# Patient Record
Sex: MALE | Race: OTHER | HISPANIC OR LATINO | URBAN - METROPOLITAN AREA
[De-identification: names, ages, dates, MRNs, and addresses within clinical notes are randomized per-mention and may not be internally consistent; named-entity substitution may affect disease eponyms.]

---

## 2022-04-10 ENCOUNTER — EMERGENCY (EMERGENCY)
Facility: HOSPITAL | Age: 22
LOS: 0 days | Discharge: HOME | End: 2022-04-10
Attending: EMERGENCY MEDICINE
Payer: MEDICAID

## 2022-04-10 VITALS
RESPIRATION RATE: 18 BRPM | DIASTOLIC BLOOD PRESSURE: 69 MMHG | HEART RATE: 61 BPM | SYSTOLIC BLOOD PRESSURE: 111 MMHG | TEMPERATURE: 98 F | OXYGEN SATURATION: 98 %

## 2022-04-10 VITALS
DIASTOLIC BLOOD PRESSURE: 54 MMHG | HEART RATE: 54 BPM | RESPIRATION RATE: 16 BRPM | SYSTOLIC BLOOD PRESSURE: 98 MMHG | OXYGEN SATURATION: 100 % | TEMPERATURE: 98 F
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
## 2022-04-10 DIAGNOSIS — S51.052A OPEN BITE, LEFT ELBOW, INITIAL ENCOUNTER: ICD-10-CM
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
## 2022-04-10 DIAGNOSIS — Z23 ENCOUNTER FOR IMMUNIZATION: ICD-10-CM

## 2022-04-10 DIAGNOSIS — S51.032A PUNCTURE WOUND WITHOUT FOREIGN BODY OF LEFT ELBOW, INITIAL ENCOUNTER: ICD-10-CM

## 2022-04-10 PROCEDURE — 90715 TDAP VACCINE 7 YRS/> IM: CPT

## 2022-04-10 PROCEDURE — 90471 IMMUNIZATION ADMIN: CPT

## 2022-04-10 PROCEDURE — 99283 EMERGENCY DEPT VISIT LOW MDM: CPT | Mod: 25

## 2022-04-10 PROCEDURE — 99284 EMERGENCY DEPT VISIT MOD MDM: CPT

## 2022-04-10 RX ORDER — TETANUS TOXOID, REDUCED DIPHTHERIA TOXOID AND ACELLULAR PERTUSSIS VACCINE, ADSORBED 5; 2.5; 8; 8; 2.5 [IU]/.5ML; [IU]/.5ML; UG/.5ML; UG/.5ML; UG/.5ML
0.5 SUSPENSION INTRAMUSCULAR ONCE
Refills: 0 | Status: COMPLETED | OUTPATIENT
Start: 2022-04-10 | End: 2022-04-10

## 2022-04-10 RX ADMIN — Medication 1 TABLET(S): at 21:55

## 2022-04-10 RX ADMIN — TETANUS TOXOID, REDUCED DIPHTHERIA TOXOID AND ACELLULAR PERTUSSIS VACCINE, ADSORBED 0.5 MILLILITER(S): 5; 2.5; 8; 8; 2.5 SUSPENSION INTRAMUSCULAR at 21:55

## 2022-04-10 NOTE — ED STATDOCS - NSFOLLOWUPCLINICS_GEN_ALL_ED_FT
[facility]  Family Medicine  [address] [place] [address]  Phone: [phone]  Fax:

## 2022-04-10 NOTE — ED STATDOCS - CLINICAL SUMMARY MEDICAL DECISION MAKING FREE TEXT BOX
20 y/o male with domesticated dog bite. Will fill out dog bite form to send to [facility], Tdap & Augmentin to be given in the ED, and wound check by PMD within a week.

## 2022-04-10 NOTE — ED STATDOCS - CHIEF COMPLAINT
[facility]  [address], [address]  [place] [address]  [phone]      [birth year] 2017      Arnold Horne  [address] [address]        To whom it may concern,    Arnold Horne is under my care for migraines and for vertigo.  He may occasionally need to miss school when symptoms of these conditions are incapacitating.       Sincerely,          Shanelle So MD      The patient is a 21y year old Male complaining of bite, animal.

## 2022-04-10 NOTE — ED STATDOCS - SKIN, MLM
circular dog bite on the left antecubital fossa with multiple punctures, no active bleeding, no fluctuance or hematoma.

## 2022-04-10 NOTE — ED STATDOCS - PATIENT PORTAL LINK FT
You can access the FollowMyHealth Patient Portal offered by [facility] by registering at the following website: [email or web address]. By joining [employer]’s FollowMyHealth portal, you will also be able to view your health information using other applications (apps) compatible with our system.

## 2022-04-10 NOTE — ED ADULT TRIAGE NOTE - CHIEF COMPLAINT QUOTE
pt states he got bit by a dog earlier today. pt has small bite efren on left inner elbow. no bleeding noted. no signs of distress noted. pt ambulatory in triage.

## 2022-04-10 NOTE — ED STATDOCS - NSFOLLOWUPINSTRUCTIONS_ED_ALL_ED_FT
Take ibuprofen or tylenol for pain.   antibiotic at pharmacy and take as prescribed.   Wound check by your doctor within 1 week.                                                                                                                                                                                                                                                                                                                                                                                                                                                                                                                                                                                                                                                                                                                                                                                                                                                                              Animal Bite, Adult      Animal bite wounds can be mild or serious. It is important to get medical treatment to prevent infection. Ask your doctor if you need treatment to prevent an infection that can spread from animals to humans (rabies).      Follow these instructions at home:      Wound care    •Follow instructions from your doctor about how to take care of your wound. Make sure you:  •Wash your hands with soap and water before you change your bandage (dressing). If you cannot use soap and water, use hand .      •Change your bandage as told by your doctor.      •Leave stitches (sutures), skin glue, or skin tape (adhesive) strips in place. They may need to stay in place for 2 weeks or longer. If tape strips get loose and curl up, you may trim the loose edges. Do not remove tape strips completely unless your doctor says it is okay.      •Check your wound every day for signs of infection. Check for:  •More redness, swelling, or pain.      •More fluid or blood.      •Warmth.      •Pus or a bad smell.        Medicines     •Take or apply over-the-counter and prescription medicines only as told by your doctor.      •If you were prescribed an antibiotic, take or apply it as told by your doctor. Do not stop using the antibiotic even if your wound gets better.        General instructions      •Keep the injured area raised (elevated) above the level of your heart while you are sitting or lying down.    •If directed, put ice on the injured area.  •Put ice in a plastic bag.      •Place a towel between your skin and the bag.      •Leave the ice on for 20 minutes, 2–3 times per day.        •Keep all follow-up visits as told by your doctor. This is important.        Contact a doctor if:    •You have more redness, swelling, or pain around your wound.      •Your wound feels warm to the touch.      •You have a fever or chills.      •You have a general feeling of sickness (malaise).      •You feel sick to your stomach (nauseous).      •You throw up (vomit).      •You have pain that does not get better.        Get help right away if:    •You have a red streak going away from your wound.    •You have any of these coming from your wound:  •Non-clear fluid.      •More blood.      •Pus or a bad smell.        •You have trouble moving your injured area.      •You lose feeling (have numbness) or feel tingling anywhere on your body.        Summary    •It is important to get the right medical treatment for animal bites. Treatment can help you to not get an infection. Ask your doctor if you need treatment to prevent an infection that can spread from animals to humans (rabies).      •Check your wound every day for signs of infection, such as more redness or swelling instead of less.      •If you have a red streak going away from your wound, get medical help right away.      This information is not intended to replace advice given to you by your health care provider. Make sure you discuss any questions you have with your health care provider.      Document Revised: 10/12/2021 Document Reviewed: 10/12/2021    Elsevier Patient Education © 2022 Elsevier Inc.

## 2022-04-10 NOTE — ED STATDOCS - OBJECTIVE STATEMENT
20 y/o male presents to the ED c/o dog bite to left inner elbow approx. 2 hours ago. Pt reports he was bit by a [place] lizama. Dog's vaccinations are reportedly UTD. Denies other injury or symptoms. Pt's tetanus not UTD, No other complaints at this time.

## 2022-04-10 NOTE — ED STATDOCS - MUSCULOSKELETAL, MLM
range of motion is not limited and there is no muscle tenderness. Left arm: soft compartment, FROM, and hetal pulses.

## 2022-05-07 ENCOUNTER — EMERGENCY (EMERGENCY)
Facility: HOSPITAL | Age: 22
LOS: 0 days | Discharge: HOME | End: 2022-05-07
Attending: EMERGENCY MEDICINE
Payer: MEDICAID

## 2022-05-07 VITALS
TEMPERATURE: 99 F | HEART RATE: 77 BPM | SYSTOLIC BLOOD PRESSURE: 131 MMHG | RESPIRATION RATE: 18 BRPM | DIASTOLIC BLOOD PRESSURE: 67 MMHG | OXYGEN SATURATION: 100 %

## 2022-05-07 VITALS
TEMPERATURE: 98 F | HEART RATE: 70 BPM | OXYGEN SATURATION: 100 % | SYSTOLIC BLOOD PRESSURE: 126 MMHG | DIASTOLIC BLOOD PRESSURE: 62 MMHG | RESPIRATION RATE: 18 BRPM

## 2022-05-07 DIAGNOSIS — R07.89 OTHER CHEST PAIN: ICD-10-CM

## 2022-05-07 DIAGNOSIS — R06.02 SHORTNESS OF BREATH: ICD-10-CM

## 2022-05-07 DIAGNOSIS — R00.2 PALPITATIONS: ICD-10-CM

## 2022-05-07 PROBLEM — Z78.9 OTHER SPECIFIED HEALTH STATUS: Chronic | Status: ACTIVE | Noted: 2022-04-10

## 2022-05-07 LAB
ALBUMIN SERPL ELPH-MCNC: 4.1 G/DL — SIGNIFICANT CHANGE UP (ref 3.3–5)
ALP SERPL-CCNC: 84 U/L — SIGNIFICANT CHANGE UP (ref 40–120)
ALT FLD-CCNC: 32 U/L — SIGNIFICANT CHANGE UP (ref 12–78)
ANION GAP SERPL CALC-SCNC: 8 MMOL/L — SIGNIFICANT CHANGE UP (ref 5–17)
APTT BLD: 32.9 SEC — SIGNIFICANT CHANGE UP (ref 27.5–35.5)
AST SERPL-CCNC: 18 U/L — SIGNIFICANT CHANGE UP (ref 15–37)
BASOPHILS # BLD AUTO: 0.04 K/UL — SIGNIFICANT CHANGE UP (ref 0–0.2)
BASOPHILS NFR BLD AUTO: 0.4 % — SIGNIFICANT CHANGE UP (ref 0–2)
BILIRUB SERPL-MCNC: 0.3 MG/DL — SIGNIFICANT CHANGE UP (ref 0.2–1.2)
BUN SERPL-MCNC: 18 MG/DL — SIGNIFICANT CHANGE UP (ref 7–23)
CALCIUM SERPL-MCNC: 9 MG/DL — SIGNIFICANT CHANGE UP (ref 8.5–10.1)
CHLORIDE SERPL-SCNC: 107 MMOL/L — SIGNIFICANT CHANGE UP (ref 96–108)
CO2 SERPL-SCNC: 24 MMOL/L — SIGNIFICANT CHANGE UP (ref 22–31)
CREAT SERPL-MCNC: 0.96 MG/DL — SIGNIFICANT CHANGE UP (ref 0.5–1.3)
D DIMER BLD IA.RAPID-MCNC: <150 NG/ML DDU — SIGNIFICANT CHANGE UP
EGFR: 116 ML/MIN/1.73M2 — SIGNIFICANT CHANGE UP
EOSINOPHIL # BLD AUTO: 0.16 K/UL — SIGNIFICANT CHANGE UP (ref 0–0.5)
EOSINOPHIL NFR BLD AUTO: 1.7 % — SIGNIFICANT CHANGE UP (ref 0–6)
GLUCOSE SERPL-MCNC: 110 MG/DL — HIGH (ref 70–99)
HCT VFR BLD CALC: 38.4 % — LOW (ref 39–50)
HGB BLD-MCNC: 13.4 G/DL — SIGNIFICANT CHANGE UP (ref 13–17)
IMM GRANULOCYTES NFR BLD AUTO: 0.2 % — SIGNIFICANT CHANGE UP (ref 0–1.5)
INR BLD: 1.2 RATIO — HIGH (ref 0.88–1.16)
LYMPHOCYTES # BLD AUTO: 2.22 K/UL — SIGNIFICANT CHANGE UP (ref 1–3.3)
LYMPHOCYTES # BLD AUTO: 24 % — SIGNIFICANT CHANGE UP (ref 13–44)
MAGNESIUM SERPL-MCNC: 2.3 MG/DL — SIGNIFICANT CHANGE UP (ref 1.6–2.6)
MCHC RBC-ENTMCNC: 30 PG — SIGNIFICANT CHANGE UP (ref 27–34)
MCHC RBC-ENTMCNC: 34.9 GM/DL — SIGNIFICANT CHANGE UP (ref 32–36)
MCV RBC AUTO: 85.9 FL — SIGNIFICANT CHANGE UP (ref 80–100)
MONOCYTES # BLD AUTO: 0.76 K/UL — SIGNIFICANT CHANGE UP (ref 0–0.9)
MONOCYTES NFR BLD AUTO: 8.2 % — SIGNIFICANT CHANGE UP (ref 2–14)
NEUTROPHILS # BLD AUTO: 6.05 K/UL — SIGNIFICANT CHANGE UP (ref 1.8–7.4)
NEUTROPHILS NFR BLD AUTO: 65.5 % — SIGNIFICANT CHANGE UP (ref 43–77)
NT-PROBNP SERPL-SCNC: 20 PG/ML — SIGNIFICANT CHANGE UP (ref 0–125)
PCP SPEC-MCNC: SIGNIFICANT CHANGE UP
PLATELET # BLD AUTO: 259 K/UL — SIGNIFICANT CHANGE UP (ref 150–400)
POTASSIUM SERPL-MCNC: 4.1 MMOL/L — SIGNIFICANT CHANGE UP (ref 3.5–5.3)
POTASSIUM SERPL-SCNC: 4.1 MMOL/L — SIGNIFICANT CHANGE UP (ref 3.5–5.3)
PROT SERPL-MCNC: 7.5 GM/DL — SIGNIFICANT CHANGE UP (ref 6–8.3)
PROTHROM AB SERPL-ACNC: 13.9 SEC — HIGH (ref 10.5–13.4)
RBC # BLD: 4.47 M/UL — SIGNIFICANT CHANGE UP (ref 4.2–5.8)
RBC # FLD: 12.1 % — SIGNIFICANT CHANGE UP (ref 10.3–14.5)
SODIUM SERPL-SCNC: 139 MMOL/L — SIGNIFICANT CHANGE UP (ref 135–145)
TROPONIN I, HIGH SENSITIVITY RESULT: 16.38 NG/L — SIGNIFICANT CHANGE UP
WBC # BLD: 9.25 K/UL — SIGNIFICANT CHANGE UP (ref 3.8–10.5)
WBC # FLD AUTO: 9.25 K/UL — SIGNIFICANT CHANGE UP (ref 3.8–10.5)

## 2022-05-07 PROCEDURE — 85379 FIBRIN DEGRADATION QUANT: CPT

## 2022-05-07 PROCEDURE — 80053 COMPREHEN METABOLIC PANEL: CPT

## 2022-05-07 PROCEDURE — 71045 X-RAY EXAM CHEST 1 VIEW: CPT | Mod: 26

## 2022-05-07 PROCEDURE — 80307 DRUG TEST PRSMV CHEM ANLYZR: CPT

## 2022-05-07 PROCEDURE — 93005 ELECTROCARDIOGRAM TRACING: CPT

## 2022-05-07 PROCEDURE — 36415 COLL VENOUS BLD VENIPUNCTURE: CPT

## 2022-05-07 PROCEDURE — 83735 ASSAY OF MAGNESIUM: CPT

## 2022-05-07 PROCEDURE — 84484 ASSAY OF TROPONIN QUANT: CPT

## 2022-05-07 PROCEDURE — 85730 THROMBOPLASTIN TIME PARTIAL: CPT

## 2022-05-07 PROCEDURE — 85610 PROTHROMBIN TIME: CPT

## 2022-05-07 PROCEDURE — 99285 EMERGENCY DEPT VISIT HI MDM: CPT | Mod: 25

## 2022-05-07 PROCEDURE — 93010 ELECTROCARDIOGRAM REPORT: CPT

## 2022-05-07 PROCEDURE — 71045 X-RAY EXAM CHEST 1 VIEW: CPT

## 2022-05-07 PROCEDURE — 99285 EMERGENCY DEPT VISIT HI MDM: CPT

## 2022-05-07 PROCEDURE — 83880 ASSAY OF NATRIURETIC PEPTIDE: CPT

## 2022-05-07 PROCEDURE — 85025 COMPLETE CBC W/AUTO DIFF WBC: CPT

## 2022-05-07 NOTE — ED STATDOCS - CLINICAL SUMMARY MEDICAL DECISION MAKING FREE TEXT BOX
22 yo well appearing with episodes of palpitations, chest tightness and SOB. Nl ekg. suspect anxiety however never had this before. Denies drugs.  Will ck labs, cxr. ReEval.

## 2022-05-07 NOTE — ED STATDOCS - NS ED ATTENDING STATEMENT MOD
This was a shared visit with the JASS. I reviewed and verified the documentation and independently performed the documented:

## 2022-05-07 NOTE — ED ADULT NURSE NOTE - NSIMPLEMENTINTERV_GEN_ALL_ED
Implemented All Universal Safety Interventions:  [place] to call system. Call bell, personal items and telephone within reach. Instruct patient to call for assistance. Room bathroom lighting operational. Non-slip footwear when patient is off stretcher. Physically safe environment: no spills, clutter or unnecessary equipment. Stretcher in lowest position, wheels locked, appropriate side rails in place.

## 2022-05-07 NOTE — ED STATDOCS - PHYSICAL EXAMINATION
Constitutional: NAD AOx3  Eyes: PERRL EOMI  Head: Normocephalic atraumatic  Mouth: MMM  Cardiac: regular rate and rhythm  Resp: Lungs CTAB  GI: Abd s/nd/nt  Neuro: CN2-12 grossly intact, JOHN x 4  Skin: No visible rashes

## 2022-05-07 NOTE — ED ADULT TRIAGE NOTE - CHIEF COMPLAINT QUOTE
PT C/O "[employer] HR, AND TIGHTNESS IN CHEST AND DIZZINESS" FOR 4 DAYS.  EKG REQUESTED UPON ARRIVAL TO ED.  DENIES ANY RECREATIONAL DRUG USE OR ETOH USE

## 2022-05-07 NOTE — ED STATDOCS - OBJECTIVE STATEMENT
22 yo M no PMHX from home, with c/o episodes in last 4 days of palpitations, chest tightness and almost passing out. Lasts few seconds. No known trigger. Not food related or exercise related. Denies new vitamins, caffeine, supplements. Denies vaping or drugs. Denies any recent stressors. Has had 3/3 covid vaccines. Has not had Covid. No early CAD in the family. No surgeries. Feels well now.

## 2022-05-07 NOTE — ED STATDOCS - PATIENT PORTAL LINK FT
You can access the FollowMyHealth Patient Portal offered by [facility] by registering at the following website: [email or web address]. By joining [employer]’s FollowMyHealth portal, you will also be able to view your health information using other applications (apps) compatible with our system.

## 2022-05-07 NOTE — ED STATDOCS - CARE PROVIDER_API CALL
Torres De Jesus (MD)  Cardiovascular Disease  [address], [address]  [place] [address]  Phone: [phone]  Fax: [phone]  Follow Up Time: Urgent

## 2022-05-07 NOTE — ED STATDOCS - PROGRESS NOTE DETAILS
pt aware of labs and imaging results and will fu with cardiologist, pt presented with palpitations, pt knows to return to ED for any worsening of symptoms and agrees with plan. -Joselyn Kendall PA-C

## 2022-05-07 NOTE — ED STATDOCS - NSFOLLOWUPCLINICS_GEN_ALL_ED_FT
[facility]  Family Medicine  [address] [place] [address]  Phone: [phone]  Fax:   Follow Up Time: Urgent

## 2022-05-19 ENCOUNTER — APPOINTMENT (OUTPATIENT)
Dept: CARDIOLOGY | Facility: CLINIC | Age: 22
End: 2022-05-19
Payer: MEDICAID

## 2022-05-19 VITALS
DIASTOLIC BLOOD PRESSURE: 60 MMHG | HEART RATE: 59 BPM | SYSTOLIC BLOOD PRESSURE: 110 MMHG | OXYGEN SATURATION: 99 % | BODY MASS INDEX: 23.55 KG/M2 | HEIGHT: 70 IN | WEIGHT: 164.46 LBS

## 2022-05-19 DIAGNOSIS — R55 SYNCOPE AND COLLAPSE: ICD-10-CM

## 2022-05-19 PROCEDURE — 93000 ELECTROCARDIOGRAM COMPLETE: CPT

## 2022-05-19 PROCEDURE — 99204 OFFICE O/P NEW MOD 45 MIN: CPT

## 2022-05-19 NOTE — HISTORY OF PRESENT ILLNESS
[FreeTextEntry1] : 22 yo male presents for evaluation of episodes of palpitations and near syncope. These episodes have occurred at rest. 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 Pt denies family history of CAD. ED visit and ECG were reviewed and were without abnormality.

## 2022-05-19 NOTE — DISCUSSION/SUMMARY
[FreeTextEntry1] : ECG done to evaluate for arrhythmia. Pt will follow up in 3 weeks. Echo,ETT and Zio will be ordered to evaluate arrhythmia.

## 2022-05-19 NOTE — REVIEW OF SYSTEMS
[Chest Discomfort] : chest discomfort [Palpitations] : palpitations [Negative] : Heme/Lymph [de-identified] : near syncope

## 2022-06-02 ENCOUNTER — EMERGENCY (EMERGENCY)
Facility: HOSPITAL | Age: 22
LOS: 0 days | Discharge: HOME | End: 2022-06-02
Attending: EMERGENCY MEDICINE
Payer: MEDICAID

## 2022-06-02 VITALS
RESPIRATION RATE: 14 BRPM | HEART RATE: 52 BPM | OXYGEN SATURATION: 99 % | TEMPERATURE: 98 F | DIASTOLIC BLOOD PRESSURE: 60 MMHG | SYSTOLIC BLOOD PRESSURE: 116 MMHG

## 2022-06-02 VITALS — HEIGHT: 70 IN | WEIGHT: 162.04 LBS
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
## 2022-06-02 DIAGNOSIS — M79.674 PAIN IN RIGHT TOE(S): ICD-10-CM

## 2022-06-02 DIAGNOSIS — L03.031 CELLULITIS OF RIGHT TOE: ICD-10-CM
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
## 2022-06-02 PROCEDURE — 99283 EMERGENCY DEPT VISIT LOW MDM: CPT

## 2022-06-02 PROCEDURE — 73660 X-RAY EXAM OF TOE(S): CPT | Mod: RT

## 2022-06-02 PROCEDURE — 73660 X-RAY EXAM OF TOE(S): CPT | Mod: 26,RT

## 2022-06-02 NOTE — ED STATDOCS - CARE PLAN
1 Principal Discharge DX:	Injury of right great toe  Secondary Diagnosis:	Paronychia of toenail, right

## 2022-06-02 NOTE — ED ADULT NURSE NOTE - NSIMPLEMENTINTERV_GEN_ALL_ED
Implemented All Universal Safety Interventions:  [place] to call system. Call bell, personal items and telephone within reach. Instruct patient to call for assistance. Room bathroom lighting operational. Non-slip footwear when patient is off stretcher. Physically safe environment: no spills, clutter or unnecessary equipment. Stretcher in lowest position, wheels locked, appropriate side rails in place.

## 2022-06-02 NOTE — ED STATDOCS - PATIENT PORTAL LINK FT
You can access the FollowMyHealth Patient Portal offered by [facility] by registering at the following website: [email or web address]. By joining [employer]’s FollowMyHealth portal, you will also be able to view your health information using other applications (apps) compatible with our system.

## 2022-06-02 NOTE — ED ADULT TRIAGE NOTE - CHIEF COMPLAINT QUOTE
Pt comes to the ED complaining of dropping a wheel on his foot yesterday which broke the nail. Pt sates that when he woke this am he had a lot of pus and blood on the toe and believes that there may be a piece of nail still in his foot.

## 2022-06-02 NOTE — ED STATDOCS - NSFOLLOWUPINSTRUCTIONS_ED_ALL_ED_FT
Paronychia      Paronychia is an infection of the skin that surrounds a nail. It usually affects the skin around a fingernail, but it may also occur near a toenail. It often causes pain and swelling around the nail. In some cases, a collection of pus (abscess) can form near or under the nail.     This condition may develop suddenly, or it may develop gradually over a longer period. In most cases, paronychia is not serious, and it will clear up with treatment.      What are the causes?    This condition may be caused by bacteria or a fungus, such as yeast. The bacteria or fungus can enter the body through an opening in the skin, such as a cut or a hangnail, and cause an infection in your fingernail or toenail. Other causes may include:  •Recurrent injury to the fingernail or toenail area.      •Irritation of the base and sides of the nail (cuticle).      Injury and irritation can result in inflammation, swelling, and thickened skin around the nail.      What increases the risk?    This condition is more likely to develop in people who:  •Get their hands wet often, such as those who work as dishwashers, , or housekeepers.      •Bite their fingernails or cuticles.      •Have underlying skin conditions.      •Have hangnails or injured fingertips.      •Are exposed to irritants like detergents and other chemicals.      •Have diabetes.        What are the signs or symptoms?    Symptoms of this condition include:  •Redness and swelling of the skin near the nail.      •Tenderness around the nail when you touch the area.      •Pus-filled bumps under the cuticle.      •Fluid or pus under the nail.      •Throbbing pain in the area.        How is this diagnosed?    This condition is diagnosed with a physical exam. In some cases, a sample of pus may be tested to determine what type of bacteria or fungus is causing the condition.      How is this treated?    Treatment depends on the cause and severity of your condition. If your condition is mild, it may clear up on its own in a few days or after soaking in warm water. If needed, treatment may include:  •Antibiotic medicine, if your infection is caused by bacteria.      •Antifungal medicine, if your infection is caused by a fungus.      •A procedure to drain pus from an abscess.      •Anti-inflammatory medicine (corticosteroids).      •Removal of part of an ingrown toenail.      A bandage (dressing) may be placed over the affected area if an abscess or part of a nail has been removed.      Follow these instructions at home:    Wound care     •Keep the affected area clean.      •Soak the affected area in warm water if told to do so by your health care provider. You may be told to do this for 20 minutes, 2–3 times a day.      •Keep the area dry when you are not soaking it.      • Do not try to drain an abscess yourself.    •Follow instructions from your health care provider about how to take care of the affected area. Make sure you:  •Wash your hands with soap and water for at least 20 seconds before and after you change your dressing. If soap and water are not available, use hand .      •Change your dressing as told by your health care provider.      •If you had an abscess drained, check the area every day for signs of infection. Check for:  •Redness, swelling, or pain.      •Fluid or blood.      •Warmth.      •Pus or a bad smell.          Medicines   A prescription pill bottle with an example of a pill.   •Take over-the-counter and prescription medicines only as told by your health care provider.      •If you were prescribed an antibiotic medicine, take it as told by your health care provider. Do not stop taking the antibiotic even if you start to feel better.      General instructions     •Avoid contact with any skin irritants or allergens.      • Do not pick at the affected area.      •Keep all follow-up visits as told. This is important.      Prevention     To prevent this condition from happening again:  •Wear rubber gloves when washing dishes or doing other tasks that require your hands to get wet.      •Wear gloves if your hands might come in contact with  or other chemicals.      •Avoid injuring your nails or fingertips.      •Do not bite your nails or tear hangnails.      •Do not cut your nails very short.      •Do not cut your cuticles.      •Use clean nail clippers or scissors when trimming nails.        Contact a health care provider if:    •Your symptoms get worse or do not improve with treatment.      •You have continued or increased fluid, blood, or pus coming from the affected area.      •Your affected finger, toe, or joint becomes swollen or difficult to move.      •You have a fever or chills.      •There is redness spreading away from the affected area.        Summary    •Paronychia is an infection of the skin that surrounds a nail. It often causes pain and swelling around the nail. In some cases, a collection of pus (abscess) can form near or under the nail.      •This condition may be caused by bacteria or a fungus. These germs can enter the body through an opening in the skin, such as a cut or a hangnail.      •If your condition is mild, it may clear up on its own in a few days. If needed, treatment may include medicine or a procedure to drain pus from an abscess.      •To prevent this condition from happening again, wear gloves if doing tasks that require your hands to get wet or to come in contact with chemicals. Also avoid injuring your nails or fingertips.      This information is not intended to replace advice given to you by your health care provider. Make sure you discuss any questions you have with your health care provider.      Document Revised: 03/21/2022 Document Reviewed: 03/21/2022    [employer] Patient Education © 2022 Elsevier Inc.

## 2022-06-02 NOTE — ED STATDOCS - OBJECTIVE STATEMENT
20 yo M no significant PMHx presents with CC of toe pain.  Pt states he dropped something on his right great toe yesterday.  C/o right great toe pain, redness around the nail, states there was pus that drained today.  Denies fever, or any other symptoms.  OTC meds taken at home.

## 2022-06-08 DIAGNOSIS — R07.89 OTHER CHEST PAIN: ICD-10-CM

## 2022-06-08 DIAGNOSIS — R00.2 PALPITATIONS: ICD-10-CM

## 2022-06-12 NOTE — ED STATDOCS - SKIN [+], MLM
Problem: Knowledge Deficit - Standard  Goal: Patient and family/care givers will demonstrate understanding of plan of care, disease process/condition, diagnostic tests and medications  Outcome: Progressing     Problem: Skin Integrity  Goal: Skin integrity is maintained or improved  Outcome: Progressing     Problem: Fall Risk  Goal: Patient will remain free from falls  Outcome: Progressing     Problem: Pain - Standard  Goal: Alleviation of pain or a reduction in pain to the patient’s comfort goal  Outcome: Progressing   The patient is Watcher - Medium risk of patient condition declining or worsening    Shift Goals  Clinical Goals: Monitor HR, ABX    Progress made toward(s) clinical / shift goals:  Progressing    Patient is not progressing towards the following goals:       dog bite to left inner elbow

## 2022-06-15 ENCOUNTER — APPOINTMENT (OUTPATIENT)
Dept: CARDIOLOGY | Facility: CLINIC | Age: 22
End: 2022-06-15
Payer: MEDICAID

## 2022-06-15 PROCEDURE — 93015 CV STRESS TEST SUPVJ I&R: CPT

## 2022-06-16 ENCOUNTER — APPOINTMENT (OUTPATIENT)
Dept: CARDIOLOGY | Facility: CLINIC | Age: 22
End: 2022-06-16

## 2022-09-02 ENCOUNTER — APPOINTMENT (OUTPATIENT)
Dept: FAMILY MEDICINE | Facility: CLINIC | Age: 22
End: 2022-09-02

## 2022-09-02 ENCOUNTER — NON-APPOINTMENT (OUTPATIENT)
Age: 22
End: 2022-09-02

## 2022-09-02 VITALS
TEMPERATURE: 98 F | DIASTOLIC BLOOD PRESSURE: 76 MMHG | OXYGEN SATURATION: 98 % | BODY MASS INDEX: 23.48 KG/M2 | HEART RATE: 57 BPM | WEIGHT: 164 LBS | SYSTOLIC BLOOD PRESSURE: 114 MMHG | HEIGHT: 70 IN

## 2022-09-02 DIAGNOSIS — R43.1 PAROSMIA: ICD-10-CM

## 2022-09-02 PROCEDURE — 99203 OFFICE O/P NEW LOW 30 MIN: CPT

## 2022-09-04 PROBLEM — R43.1 PAROSMIA: Status: ACTIVE | Noted: 2022-09-04

## 2022-10-04 ENCOUNTER — APPOINTMENT (OUTPATIENT)
Dept: FAMILY MEDICINE | Facility: CLINIC | Age: 22
End: 2022-10-04

## 2022-10-04 VITALS
OXYGEN SATURATION: 98 % | WEIGHT: 162 LBS | HEIGHT: 70 IN | TEMPERATURE: 98.4 F | DIASTOLIC BLOOD PRESSURE: 76 MMHG | HEART RATE: 62 BPM | BODY MASS INDEX: 23.19 KG/M2 | SYSTOLIC BLOOD PRESSURE: 118 MMHG

## 2022-10-04 PROCEDURE — 36415 COLL VENOUS BLD VENIPUNCTURE: CPT

## 2022-10-04 PROCEDURE — 99395 PREV VISIT EST AGE 18-39: CPT | Mod: 25

## 2022-10-04 PROCEDURE — 81003 URINALYSIS AUTO W/O SCOPE: CPT | Mod: QW

## 2022-10-05 LAB
BILIRUB UR QL STRIP: NORMAL
CLARITY UR: CLEAR
COLLECTION METHOD: NORMAL
GLUCOSE UR-MCNC: NORMAL
HCG UR QL: 0.2 EU/DL
HGB UR QL STRIP.AUTO: NORMAL
KETONES UR-MCNC: NORMAL
LEUKOCYTE ESTERASE UR QL STRIP: NORMAL
NITRITE UR QL STRIP: NORMAL
PH UR STRIP: 6
PROT UR STRIP-MCNC: NORMAL
SP GR UR STRIP: 1.03

## 2022-10-05 NOTE — PHYSICAL EXAM
[No Acute Distress] : no acute distress [Well Nourished] : well nourished [Well Developed] : well developed [Well-Appearing] : well-appearing [Normal Sclera/Conjunctiva] : normal sclera/conjunctiva [PERRL] : pupils equal round and reactive to light [EOMI] : extraocular movements intact [Normal Outer Ear/Nose] : the outer ears and nose were normal in appearance [Normal Oropharynx] : the oropharynx was normal [No JVD] : no jugular venous distention [No Lymphadenopathy] : no lymphadenopathy [Supple] : supple [Thyroid Normal, No Nodules] : the thyroid was normal and there were no nodules present [No Respiratory Distress] : no respiratory distress  [No Accessory Muscle Use] : no accessory muscle use [Clear to Auscultation] : lungs were clear to auscultation bilaterally [Normal Rate] : normal rate  [Regular Rhythm] : with a regular rhythm [Normal S1, S2] : normal S1 and S2 [No Murmur] : no murmur heard [No Carotid Bruits] : no carotid bruits [No Abdominal Bruit] : a ~M bruit was not heard ~T in the abdomen [No Varicosities] : no varicosities [Pedal Pulses Present] : the pedal pulses are present [No Edema] : there was no peripheral edema [No Palpable Aorta] : no palpable aorta [No Extremity Clubbing/Cyanosis] : no extremity clubbing/cyanosis [Soft] : abdomen soft [Non Tender] : non-tender [Non-distended] : non-distended [No Masses] : no abdominal mass palpated [No HSM] : no HSM [Normal Bowel Sounds] : normal bowel sounds [Normal Posterior Cervical Nodes] : no posterior cervical lymphadenopathy [Normal Anterior Cervical Nodes] : no anterior cervical lymphadenopathy [No CVA Tenderness] : no CVA  tenderness [No Spinal Tenderness] : no spinal tenderness [No Joint Swelling] : no joint swelling [Grossly Normal Strength/Tone] : grossly normal strength/tone [No Rash] : no rash [Coordination Grossly Intact] : coordination grossly intact [No Focal Deficits] : no focal deficits [Normal Gait] : normal gait [Deep Tendon Reflexes (DTR)] : deep tendon reflexes were 2+ and symmetric [Normal Affect] : the affect was normal [Normal Insight/Judgement] : insight and judgment were intact [FreeTextEntry1] : right\par 0.5-\par 1-\par 2-\par 4-\par \par \par \par left\par 0.5-\par 1-\par 2-\par 4-\par

## 2022-10-05 NOTE — HEALTH RISK ASSESSMENT
[Never] : Never [No] : In the past 12 months have you used drugs other than those required for medical reasons? No [0] : 2) Feeling down, depressed, or hopeless: Not at all (0) [HIV test declined] : HIV test declined [Hepatitis C test declined] : Hepatitis C test declined [With Family] : lives with family [Student] : student [High School] : high school [Single] : single [Sexually Active] : sexually active [Feels Safe at Home] : Feels safe at home [Fully functional (bathing, dressing, toileting, transferring, walking, feeding)] : Fully functional (bathing, dressing, toileting, transferring, walking, feeding) [Fully functional (using the telephone, shopping, preparing meals, housekeeping, doing laundry, using] : Fully functional and needs no help or supervision to perform IADLs (using the telephone, shopping, preparing meals, housekeeping, doing laundry, using transportation, managing medications and managing finances) [Smoke Detector] : smoke detector [Carbon Monoxide Detector] : carbon monoxide detector [Seat Belt] :  uses seat belt [de-identified] : baseball,gym [[id number]] : 0 [High Risk Behavior] : no high risk behavior [Reports changes in hearing] : Reports no changes in hearing [Reports changes in vision] : Reports no changes in vision [Reports changes in dental health] : Reports no changes in dental health [Guns at Home] : no guns at home [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

## 2022-10-05 NOTE — PLAN
[FreeTextEntry1] : Blood work done in office today. Will follow up on results with patient.\par Extensive counseling provided on lifestyle modifications (healthy diet, daily exercise)\par Return for CPE in 1 year.\par

## 2022-10-06 ENCOUNTER — TRANSCRIPTION ENCOUNTER (OUTPATIENT)
Age: 22
End: 2022-10-06

## 2022-10-10 ENCOUNTER — TRANSCRIPTION ENCOUNTER (OUTPATIENT)
Age: 22
End: 2022-10-10

## 2022-11-25 ENCOUNTER — EMERGENCY (EMERGENCY)
Facility: HOSPITAL | Age: 22
LOS: 0 days | Discharge: HOME | End: 2022-11-25
Attending: FAMILY MEDICINE
Payer: MEDICAID

## 2022-11-25 VITALS
TEMPERATURE: 98 F | DIASTOLIC BLOOD PRESSURE: 65 MMHG | OXYGEN SATURATION: 95 % | HEART RATE: 65 BPM | RESPIRATION RATE: 18 BRPM | SYSTOLIC BLOOD PRESSURE: 119 MMHG

## 2022-11-25 VITALS — HEIGHT: 70 IN | WEIGHT: 162.04 LBS
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
## 2022-11-25 DIAGNOSIS — K59.00 CONSTIPATION, UNSPECIFIED: ICD-10-CM

## 2022-11-25 DIAGNOSIS — T18.5XXA FOREIGN BODY IN ANUS AND RECTUM, INITIAL ENCOUNTER: ICD-10-CM
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
## 2022-11-25 DIAGNOSIS — K64.9 UNSPECIFIED HEMORRHOIDS: ICD-10-CM

## 2022-11-25 PROCEDURE — 99283 EMERGENCY DEPT VISIT LOW MDM: CPT

## 2022-11-25 PROCEDURE — 99284 EMERGENCY DEPT VISIT MOD MDM: CPT

## 2022-11-25 RX ORDER — HYDROCORTISONE 1 %
1 OINTMENT (GRAM) TOPICAL
Qty: 5 | Refills: 0
Start: 2022-11-25 | End: 2022-11-29

## 2022-11-25 NOTE — ED STATDOCS - CLINICAL SUMMARY MEDICAL DECISION MAKING FREE TEXT BOX
pt with swelling in the rectum, probable hemorrhoid, will refer to colorectal and give NSOL suppository.

## 2022-11-25 NOTE — ED STATDOCS - OBJECTIVE STATEMENT
21 y/o M with no PMhx c/o foreign body in rectum. Noticed lump in rectum, with no pain. Reports constipation. Denies fever. No prior episode. lifts boxes at work. Non-drinker, Non-smoker.

## 2022-11-25 NOTE — ED STATDOCS - CARE PROVIDER_API CALL
Jackson Marcelino  SURGERY  [address] [place] [address]  Phone: [phone]  Fax: [phone]  Follow Up Time: Urgent

## 2022-11-25 NOTE — ED STATDOCS - PROGRESS NOTE DETAILS
pt aware to fu with surgeon and use anusol as directed. pt agrees with plan and well appearing on dc. -Joselyn Kendall PA-C

## 2022-11-25 NOTE — ED STATDOCS - GASTROINTESTINAL, MLM
abdomen soft, non-tender, and non-distended. Bowel sounds present. Small fleshy swelling in rectum at 12 o clock, nt

## 2022-11-25 NOTE — ED ADULT TRIAGE NOTE - CHIEF COMPLAINT QUOTE
Pt comes to the ED complaining of ball/lump growth on opening of anus. Pt says that it appeared overnight. Pt denies any pain. No bleeding.

## 2022-11-25 NOTE — ED STATDOCS - PATIENT PORTAL LINK FT
You can access the FollowMyHealth Patient Portal offered by [facility] by registering at the following website: [email or web address]. By joining [employer]’s FollowMyHealth portal, you will also be able to view your health information using other applications (apps) compatible with our system.

## 2022-11-25 NOTE — ED STATDOCS - NSFOLLOWUPINSTRUCTIONS_ED_ALL_ED_FT
Hemorrhoids    The colon, with 3 close-ups of the rectum. One is normal and the other 2 show external and internal hemorrhoids.   Hemorrhoids are swollen veins in and around the rectum or anus. There are two types of hemorrhoids:  •Internal hemorrhoids. These occur in the veins that are just inside the rectum. They may poke through to the outside and become irritated and painful.      •External hemorrhoids. These occur in the veins that are outside the anus and can be felt as a painful swelling or hard lump near the anus.      Most hemorrhoids do not cause serious problems, and they can be managed with home treatments such as diet and lifestyle changes. If home treatments do not help the symptoms, procedures can be done to shrink or remove the hemorrhoids.      What are the causes?    This condition is caused by increased pressure in the anal area. This pressure may result from various things, including:  •Constipation.      •Straining to have a bowel movement.      •Diarrhea.      •Pregnancy.      •Obesity.      •Sitting for long periods of time.      •Heavy lifting or other activity that causes you to strain.      •Anal sex.      •Riding a bike for a long period of time.        What are the signs or symptoms?    Symptoms of this condition include:  •Pain.      •Anal itching or irritation.      •Rectal bleeding.      •Leakage of stool (feces).      •Anal swelling.      •One or more lumps around the anus.        How is this diagnosed?    This condition can often be diagnosed through a visual exam. Other exams or tests may also be done, such as:  •An exam that involves feeling the rectal area with a gloved hand (digital rectal exam).      •An exam of the anal canal that is done using a small tube (anoscope).      •A blood test, if you have lost a significant amount of blood.      •A test to look inside the colon using a flexible tube with a camera on the end (sigmoidoscopy or colonoscopy).        How is this treated?    This condition can usually be treated at home. However, various procedures may be done if dietary changes, lifestyle changes, and other home treatments do not help your symptoms. These procedures can help make the hemorrhoids smaller or remove them completely. Some of these procedures involve surgery, and others do not. Common procedures include:  •Rubber band ligation. Rubber bands are placed at the base of the hemorrhoids to cut off their blood supply.      •Sclerotherapy. Medicine is injected into the hemorrhoids to shrink them.      •Infrared coagulation. A type of light energy is used to get rid of the hemorrhoids.      •Hemorrhoidectomy surgery. The hemorrhoids are surgically removed, and the veins that supply them are tied off.      •Stapled hemorrhoidopexy surgery. The surgeon staples the base of the hemorrhoid to the rectal wall.        Follow these instructions at home:      Eating and drinking   A sampling of vegetables and other plant-based foods.   •Eat foods that have a lot of fiber in them, such as whole grains, beans, nuts, fruits, and vegetables.      •Ask your health care provider about taking products that have added fiber (fiber supplements).      •Reduce the amount of fat in your diet. You can do this by eating low-fat dairy products, eating less red meat, and avoiding processed foods.      •Drink enough fluid to keep your urine pale yellow.        Managing pain and swelling   A bathtub partially filled with water.   •Take warm sitz baths for 20 minutes, 3–4 times a day to ease pain and discomfort. You may do this in a bathtub or using a portable sitz bath that fits over the toilet.    •If directed, apply ice to the affected area. Using ice packs between sitz baths may be helpful.  •Put ice in a plastic bag.      •Place a towel between your skin and the bag.      •Leave the ice on for 20 minutes, 2–3 times a day.        General instructions     •Take over-the-counter and prescription medicines only as told by your health care provider.      •Use medicated creams or suppositories as told.      •Get regular exercise. Ask your health care provider how much and what kind of exercise is best for you. In general, you should do moderate exercise for at least 30 minutes on most days of the week (150 minutes each week). This can include activities such as walking, biking, or yoga.      •Go to the bathroom when you have the urge to have a bowel movement. Do not wait.      •Avoid straining to have bowel movements.      •Keep the anal area dry and clean. Use wet toilet paper or moist towelettes after a bowel movement.      • Do not sit on the toilet for long periods of time. This increases blood pooling and pain.      •Keep all follow-up visits as told by your health care provider. This is important.        Contact a health care provider if you have:    •Increasing pain and swelling that are not controlled by treatment or medicine.      •Difficulty having a bowel movement, or you are unable to have a bowel movement.      •Pain or inflammation outside the area of the hemorrhoids.        Get help right away if you have:    •Uncontrolled bleeding from your rectum.        Summary    •Hemorrhoids are swollen veins in and around the rectum or anus.      •Most hemorrhoids can be managed with home treatments such as diet and lifestyle changes.      •Taking warm sitz baths can help ease pain and discomfort.      •In severe cases, procedures or surgery can be done to shrink or remove the hemorrhoids.      This information is not intended to replace advice given to you by your health care provider. Make sure you discuss any questions you have with your health care provider.

## 2023-01-03 ENCOUNTER — EMERGENCY (EMERGENCY)
Facility: HOSPITAL | Age: 23
LOS: 0 days | Discharge: HOME | End: 2023-01-03
Attending: EMERGENCY MEDICINE
Payer: MEDICAID

## 2023-01-03 VITALS
DIASTOLIC BLOOD PRESSURE: 68 MMHG | HEART RATE: 72 BPM | TEMPERATURE: 99 F | SYSTOLIC BLOOD PRESSURE: 125 MMHG | RESPIRATION RATE: 16 BRPM | OXYGEN SATURATION: 99 %

## 2023-01-03 VITALS — HEIGHT: 70 IN | WEIGHT: 169.98 LBS

## 2023-01-03 DIAGNOSIS — R11.10 VOMITING, UNSPECIFIED: ICD-10-CM

## 2023-01-03 DIAGNOSIS — K59.00 CONSTIPATION, UNSPECIFIED: ICD-10-CM

## 2023-01-03 LAB
ALBUMIN SERPL ELPH-MCNC: 4 G/DL — SIGNIFICANT CHANGE UP (ref 3.3–5)
ALP SERPL-CCNC: 93 U/L — SIGNIFICANT CHANGE UP (ref 40–120)
ALT FLD-CCNC: 28 U/L — SIGNIFICANT CHANGE UP (ref 12–78)
ANION GAP SERPL CALC-SCNC: 7 MMOL/L — SIGNIFICANT CHANGE UP (ref 5–17)
APPEARANCE UR: CLEAR — SIGNIFICANT CHANGE UP
AST SERPL-CCNC: 18 U/L — SIGNIFICANT CHANGE UP (ref 15–37)
BASOPHILS # BLD AUTO: 0.03 K/UL — SIGNIFICANT CHANGE UP (ref 0–0.2)
BASOPHILS NFR BLD AUTO: 0.4 % — SIGNIFICANT CHANGE UP (ref 0–2)
BILIRUB SERPL-MCNC: 0.4 MG/DL — SIGNIFICANT CHANGE UP (ref 0.2–1.2)
BILIRUB UR-MCNC: NEGATIVE — SIGNIFICANT CHANGE UP
BUN SERPL-MCNC: 12 MG/DL — SIGNIFICANT CHANGE UP (ref 7–23)
CALCIUM SERPL-MCNC: 8.8 MG/DL — SIGNIFICANT CHANGE UP (ref 8.5–10.1)
CHLORIDE SERPL-SCNC: 103 MMOL/L — SIGNIFICANT CHANGE UP (ref 96–108)
CO2 SERPL-SCNC: 28 MMOL/L — SIGNIFICANT CHANGE UP (ref 22–31)
COLOR SPEC: YELLOW — SIGNIFICANT CHANGE UP
CREAT SERPL-MCNC: 0.86 MG/DL — SIGNIFICANT CHANGE UP (ref 0.5–1.3)
DIFF PNL FLD: NEGATIVE — SIGNIFICANT CHANGE UP
EGFR: 126 ML/MIN/1.73M2 — SIGNIFICANT CHANGE UP
EOSINOPHIL # BLD AUTO: 0.28 K/UL — SIGNIFICANT CHANGE UP (ref 0–0.5)
EOSINOPHIL NFR BLD AUTO: 3.5 % — SIGNIFICANT CHANGE UP (ref 0–6)
GLUCOSE SERPL-MCNC: 91 MG/DL — SIGNIFICANT CHANGE UP (ref 70–99)
GLUCOSE UR QL: NEGATIVE — SIGNIFICANT CHANGE UP
HCT VFR BLD CALC: 42 % — SIGNIFICANT CHANGE UP (ref 39–50)
HGB BLD-MCNC: 14.3 G/DL — SIGNIFICANT CHANGE UP (ref 13–17)
IMM GRANULOCYTES NFR BLD AUTO: 0.1 % — SIGNIFICANT CHANGE UP (ref 0–0.9)
KETONES UR-MCNC: NEGATIVE — SIGNIFICANT CHANGE UP
LEUKOCYTE ESTERASE UR-ACNC: NEGATIVE — SIGNIFICANT CHANGE UP
LIDOCAIN IGE QN: 101 U/L — SIGNIFICANT CHANGE UP (ref 73–393)
LYMPHOCYTES # BLD AUTO: 1.71 K/UL — SIGNIFICANT CHANGE UP (ref 1–3.3)
LYMPHOCYTES # BLD AUTO: 21.3 % — SIGNIFICANT CHANGE UP (ref 13–44)
MCHC RBC-ENTMCNC: 29.5 PG — SIGNIFICANT CHANGE UP (ref 27–34)
MCHC RBC-ENTMCNC: 34 GM/DL — SIGNIFICANT CHANGE UP (ref 32–36)
MCV RBC AUTO: 86.8 FL — SIGNIFICANT CHANGE UP (ref 80–100)
MONOCYTES # BLD AUTO: 0.83 K/UL — SIGNIFICANT CHANGE UP (ref 0–0.9)
MONOCYTES NFR BLD AUTO: 10.3 % — SIGNIFICANT CHANGE UP (ref 2–14)
NEUTROPHILS # BLD AUTO: 5.18 K/UL — SIGNIFICANT CHANGE UP (ref 1.8–7.4)
NEUTROPHILS NFR BLD AUTO: 64.4 % — SIGNIFICANT CHANGE UP (ref 43–77)
NITRITE UR-MCNC: NEGATIVE — SIGNIFICANT CHANGE UP
PH UR: 8 — SIGNIFICANT CHANGE UP (ref 5–8)
PLATELET # BLD AUTO: 221 K/UL — SIGNIFICANT CHANGE UP (ref 150–400)
POTASSIUM SERPL-MCNC: 4.1 MMOL/L — SIGNIFICANT CHANGE UP (ref 3.5–5.3)
POTASSIUM SERPL-SCNC: 4.1 MMOL/L — SIGNIFICANT CHANGE UP (ref 3.5–5.3)
PROT SERPL-MCNC: 8.1 GM/DL — SIGNIFICANT CHANGE UP (ref 6–8.3)
PROT UR-MCNC: NEGATIVE — SIGNIFICANT CHANGE UP
RBC # BLD: 4.84 M/UL — SIGNIFICANT CHANGE UP (ref 4.2–5.8)
RBC # FLD: 11.9 % — SIGNIFICANT CHANGE UP (ref 10.3–14.5)
SODIUM SERPL-SCNC: 138 MMOL/L — SIGNIFICANT CHANGE UP (ref 135–145)
SP GR SPEC: 1.01 — SIGNIFICANT CHANGE UP (ref 1.01–1.02)
UROBILINOGEN FLD QL: 1
WBC # BLD: 8.04 K/UL — SIGNIFICANT CHANGE UP (ref 3.8–10.5)
WBC # FLD AUTO: 8.04 K/UL — SIGNIFICANT CHANGE UP (ref 3.8–10.5)

## 2023-01-03 PROCEDURE — 83690 ASSAY OF LIPASE: CPT

## 2023-01-03 PROCEDURE — 99284 EMERGENCY DEPT VISIT MOD MDM: CPT | Mod: 25

## 2023-01-03 PROCEDURE — 87086 URINE CULTURE/COLONY COUNT: CPT

## 2023-01-03 PROCEDURE — 85025 COMPLETE CBC W/AUTO DIFF WBC: CPT

## 2023-01-03 PROCEDURE — 74177 CT ABD & PELVIS W/CONTRAST: CPT | Mod: 26,MA

## 2023-01-03 PROCEDURE — 74177 CT ABD & PELVIS W/CONTRAST: CPT | Mod: MA

## 2023-01-03 PROCEDURE — 81003 URINALYSIS AUTO W/O SCOPE: CPT

## 2023-01-03 PROCEDURE — 99285 EMERGENCY DEPT VISIT HI MDM: CPT

## 2023-01-03 PROCEDURE — 80053 COMPREHEN METABOLIC PANEL: CPT

## 2023-01-03 PROCEDURE — 36415 COLL VENOUS BLD VENIPUNCTURE: CPT

## 2023-01-03 RX ORDER — SODIUM CHLORIDE 9 MG/ML
1000 INJECTION INTRAMUSCULAR; INTRAVENOUS; SUBCUTANEOUS ONCE
Refills: 0 | Status: COMPLETED | OUTPATIENT
Start: 2023-01-03 | End: 2023-01-03

## 2023-01-03 RX ADMIN — SODIUM CHLORIDE 2000 MILLILITER(S): 9 INJECTION INTRAMUSCULAR; INTRAVENOUS; SUBCUTANEOUS at 11:21

## 2023-01-03 NOTE — ED ADULT NURSE NOTE - OBJECTIVE STATEMENT
Pt A&Ox4, presents to the ED c/o right sided flank pain. Denies urinary symptoms or fevers. No medication PTA.

## 2023-01-03 NOTE — ED ADULT NURSE NOTE - CHIEF COMPLAINT QUOTE
Patient is alert and oriented X3, presenting to the ER with c/o right flank pain. Patient reports "the pain began on Thursday  and has been getting progressively worse. Its a ting dully pain when I sit down."  Denies CP, SOB, fevers, vomiting, diarrhea, burning on urination, dysuria, hematuria, or blood in stool. No medications taken prior to arrival.   HX: denies

## 2023-01-03 NOTE — ED STATDOCS - ATTENDING APP SHARED VISIT CONTRIBUTION OF CARE
I, Larissa Gonzalez MD,  performed the initial face to face bedside interview with this patient regarding history of present illness, review of symptoms and relevant past medical, social and family history.  I completed an independent physical examination.  I was the initial provider who evaluated this patient.   I personally saw the patient and performed a substantive portion of the visit including all aspects of the medical decision making.  I have signed out the follow up of any pending tests (i.e. labs, radiological studies) to the [facility].  I have communicated the patient’s plan of care and disposition with the [facility].  The history, relevant review of systems, past medical and surgical history, medical decision making, and physical examination was documented by the scribe in my presence and I attest to the accuracy of the documentation.

## 2023-01-03 NOTE — ED STATDOCS - CLINICAL SUMMARY MEDICAL DECISION MAKING FREE TEXT BOX
21 y/o pt presents with Right sided abd pain, DD includes appendicitis, cholecystitis, constipation. Plan to check labs and CT scan.

## 2023-01-03 NOTE — ED STATDOCS - NSFOLLOWUPINSTRUCTIONS_ED_ALL_ED_FT
Abdominal Pain, Adult      Pain in the abdomen (abdominal pain) can be caused by many things. Often, abdominal pain is not serious and it gets better with no treatment or by being treated at home. However, sometimes abdominal pain is serious.    Your health care provider will ask questions about your medical history and do a physical exam to try to determine the cause of your abdominal pain.      Follow these instructions at home:    Medicines     •Take over-the-counter and prescription medicines only as told by your health care provider.      • Do not take a laxative unless told by your health care provider.        General instructions      •Watch your condition for any changes.      •Drink enough fluid to keep your urine pale yellow.      •Keep all follow-up visits as told by your health care provider. This is important.        Contact a health care provider if:    •Your abdominal pain changes or gets worse.      •You are not hungry or you lose weight without trying.      •You are constipated or have diarrhea for more than 2–3 days.      •You have pain when you urinate or have a bowel movement.      •Your abdominal pain wakes you up at night.      •Your pain gets worse with meals, after eating, or with certain foods.      •You are vomiting and cannot keep anything down.      •You have a fever.      •You have blood in your urine.        Get help right away if:    •Your pain does not go away as soon as your health care provider told you to expect.      •You cannot stop vomiting.      •Your pain is only in areas of the abdomen, such as the right side or the left lower portion of the abdomen. Pain on the right side could be caused by appendicitis.      •You have bloody or black stools, or stools that look like tar.      •You have severe pain, cramping, or bloating in your abdomen.    •You have signs of dehydration, such as:  •Dark urine, very little urine, or no urine.      •Cracked lips.      •Dry mouth.      •Sunken eyes.      •Sleepiness.      •Weakness.        •You have trouble breathing or chest pain.        Summary    •Often, abdominal pain is not serious and it gets better with no treatment or by being treated at home. However, sometimes abdominal pain is serious.      •Watch your condition for any changes.      •Take over-the-counter and prescription medicines only as told by your health care provider.      •Contact a health care provider if your abdominal pain changes or gets worse.      •Get help right away if you have severe pain, cramping, or bloating in your abdomen.      This information is not intended to replace advice given to you by your health care provider. Make sure you discuss any questions you have with your health care provider.      Document Revised: 02/05/2021 Document Reviewed: 04/27/2020    [employer] Patient Education © 2022 [employer]                                                                                                 Constipation, Adult      Constipation is when a person has trouble pooping (having a bowel movement). When you have this condition, you may poop fewer than 3 times a week. Your poop (stool) may also be dry, hard, or bigger than normal.      Follow these instructions at home:      Eating and drinking    •Eat foods that have a lot of fiber, such as:  •Fresh fruits and vegetables.      •Whole grains.      •Beans.      •Eat less of foods that are low in fiber and high in fat and sugar, such as:  •French fries.      •Hamburgers.      •Cookies.      •Candy.      •Soda.        •Drink enough fluid to keep your pee (urine) pale yellow.      General instructions     •Exercise regularly or as told by your doctor. Try to do 150 minutes of exercise each week.      •Go to the restroom when you feel like you need to poop. Do not hold it in.      •Take over-the-counter and prescription medicines only as told by your doctor. These include any fiber supplements.    •When you poop:  •Do deep breathing while relaxing your lower belly (abdomen).      •Relax your pelvic floor. The pelvic floor is a group of muscles that support the rectum, bladder, and intestines (as well as the uterus in women).        •Watch your condition for any changes. Tell your doctor if you notice any.      •Keep all follow-up visits as told by your doctor. This is important.        Contact a doctor if:    •You have pain that gets worse.      •You have a fever.      •You have not pooped for 4 days.      •You vomit.      •You are not hungry.      •You lose weight.      •You are bleeding from the opening of the butt (anus).      •You have thin, pencil-like poop.        Get help right away if:    •You have a fever, and your symptoms suddenly get worse.      •You leak poop or have blood in your poop.      •Your belly feels hard or bigger than normal (bloated).      •You have very bad belly pain.      •You feel dizzy or you faint.        Summary    •Constipation is when a person poops fewer than 3 times a week, has trouble pooping, or has poop that is dry, hard, or bigger than normal.      •Eat foods that have a lot of fiber.      •Drink enough fluid to keep your pee (urine) pale yellow.      •Take over-the-counter and prescription medicines only as told by your doctor. These include any fiber supplements.      This information is not intended to replace advice given to you by your health care provider. Make sure you discuss any questions you have with your health care provider.      Document Revised: 11/04/2020 Document Reviewed: 11/04/2020    [employer] Patient Education © 2022 [employer] Inc.

## 2023-01-03 NOTE — ED STATDOCS - PATIENT PORTAL LINK FT
You can access the FollowMyHealth Patient Portal offered by [facility] by registering at the following website: [email or web address]. By joining [employer]’s FollowMyHealth portal, you will also be able to view your health information using other applications (apps) compatible with our system.

## 2023-01-03 NOTE — ED STATDOCS - PROGRESS NOTE DETAILS
21 y/o Male with PMHx of Hemorrhoids, presents to eD c/o RLQ pain associated with vomiting and no BM for 3 days.  WBC not elevated.  CMP WNl.  U/a without blood, evidence of infection.  Will f/u CT and re-eval.  Bertha Vanegas PA-C

## 2023-01-03 NOTE — ED STATDOCS - OBJECTIVE STATEMENT
21 y/o male with no pertinent PMHx presents to the ED c/o progressively worsening pain/pressure in his right abdomen. States that he has been vomiting whenever he eats. Pt states the longer he sits the more pressure he feels, on the way here bumps in the road made symptoms worse. Pt states he had hemorrhoids 3 weeks ago that resolved on their own. No alcohol use, denies illegal drug use. Denies surgical history. Pt states pain is a 1 or 2, but he is very uncomfortable. Has not gone to the bathroom in 2-3 days, which is not normal for him. Denies any recent trauma.

## 2023-01-04 LAB
CULTURE RESULTS: SIGNIFICANT CHANGE UP
SPECIMEN SOURCE: SIGNIFICANT CHANGE UP

## 2023-01-17 ENCOUNTER — APPOINTMENT (OUTPATIENT)
Dept: FAMILY MEDICINE | Facility: CLINIC | Age: 23
End: 2023-01-17
Payer: MEDICAID

## 2023-01-17 VITALS
SYSTOLIC BLOOD PRESSURE: 108 MMHG | HEART RATE: 70 BPM | BODY MASS INDEX: 24.34 KG/M2 | OXYGEN SATURATION: 98 % | TEMPERATURE: 99.1 F | WEIGHT: 170 LBS | HEIGHT: 70 IN | DIASTOLIC BLOOD PRESSURE: 48 MMHG

## 2023-01-17 DIAGNOSIS — K42.9 UMBILICAL HERNIA W/OUT OBSTRUCTION OR GANGRENE: ICD-10-CM

## 2023-01-17 DIAGNOSIS — K76.9 LIVER DISEASE, UNSPECIFIED: ICD-10-CM

## 2023-01-17 DIAGNOSIS — R10.9 UNSPECIFIED ABDOMINAL PAIN: ICD-10-CM

## 2023-01-17 PROCEDURE — 99213 OFFICE O/P EST LOW 20 MIN: CPT

## 2023-01-20 PROBLEM — K42.9 UMBILICAL HERNIA: Status: ACTIVE | Noted: 2023-01-20

## 2023-01-20 PROBLEM — K76.9 LIVER LESION, RIGHT LOBE: Status: ACTIVE | Noted: 2023-01-20

## 2023-01-20 PROBLEM — R10.9 RIGHT FLANK PAIN: Status: ACTIVE | Noted: 2023-01-20

## 2023-01-20 NOTE — PLAN
[FreeTextEntry1] : Counseling provided, patient noted improvement in pain,follow-up if worsening occurs.\par Recommended patient to have repeat imaging done 1 year for follow-up of liver lesion.

## 2023-01-20 NOTE — HISTORY OF PRESENT ILLNESS
[FreeTextEntry2] : NASH SUE is a 22 year old male presenting for f/u from  ER on [place] 3 2023 with right flank pain.  Labs and urine were normal patient was discharged in stable condition.  Incidental finding of hypodense lesion in the right hepatic lobe on CT abdomen pelvis.  And small fat-containing umbilical hernia.

## 2023-02-23 ENCOUNTER — APPOINTMENT (OUTPATIENT)
Dept: GASTROENTEROLOGY | Facility: CLINIC | Age: 23
End: 2023-02-23
Payer: MEDICAID

## 2023-02-23 VITALS
DIASTOLIC BLOOD PRESSURE: 80 MMHG | HEART RATE: 72 BPM | WEIGHT: 168 LBS | HEIGHT: 70 IN | SYSTOLIC BLOOD PRESSURE: 142 MMHG | BODY MASS INDEX: 24.05 KG/M2

## 2023-02-23 DIAGNOSIS — R10.11 RIGHT UPPER QUADRANT PAIN: ICD-10-CM

## 2023-02-23 PROCEDURE — 99204 OFFICE O/P NEW MOD 45 MIN: CPT

## 2023-02-25 NOTE — PHYSICAL EXAM

## 2023-02-25 NOTE — HISTORY OF PRESENT ILLNESS
[FreeTextEntry1] : 21yo male for evaluation of ruq pain\par \par He has RUQ pain that is worse after eating\par It is better after BM and passing gas\par \par He went to er and had negative ct in er reviewed by me

## 2023-02-25 NOTE — ASSESSMENT
[FreeTextEntry1] : 21yo male with ruq pain\par \par will check sono r/o biliary disease\par \par hyoscyamine as needed simethicone\par \par consider EGD pending results above

## 2023-02-28 NOTE — ED STATDOCS - NS ED MD DISPO DISCHARGE
Home Xeljanz Counseling: I discussed with the patient the risks of Xeljanz therapy including increased risk of infection, liver issues, headache, diarrhea, or cold symptoms. Live vaccines should be avoided. They were instructed to call if they have any problems.

## 2023-03-06 ENCOUNTER — OUTPATIENT (OUTPATIENT)
Dept: OUTPATIENT SERVICES | Facility: HOSPITAL | Age: 23
LOS: 1 days | End: 2023-03-06
Payer: MEDICAID

## 2023-03-06 ENCOUNTER — APPOINTMENT (OUTPATIENT)
Dept: ULTRASOUND IMAGING | Facility: CLINIC | Age: 23
End: 2023-03-06
Payer: MEDICAID

## 2023-03-06 DIAGNOSIS — R10.11 RIGHT UPPER QUADRANT PAIN: ICD-10-CM

## 2023-03-06 PROCEDURE — 76700 US EXAM ABDOM COMPLETE: CPT

## 2023-03-06 PROCEDURE — 76700 US EXAM ABDOM COMPLETE: CPT | Mod: 26

## 2023-03-09 ENCOUNTER — NON-APPOINTMENT (OUTPATIENT)
Age: 23
End: 2023-03-09

## 2023-03-23 ENCOUNTER — EMERGENCY (EMERGENCY)
Facility: HOSPITAL | Age: 23
LOS: 0 days | Discharge: HOME | End: 2023-03-23
Attending: HOSPITALIST
Payer: MEDICAID

## 2023-03-23 VITALS
HEART RATE: 96 BPM | WEIGHT: 169.98 LBS | RESPIRATION RATE: 18 BRPM | TEMPERATURE: 99 F | HEIGHT: 70 IN | DIASTOLIC BLOOD PRESSURE: 75 MMHG | SYSTOLIC BLOOD PRESSURE: 117 MMHG | OXYGEN SATURATION: 99 %

## 2023-03-23 VITALS
HEART RATE: 88 BPM | DIASTOLIC BLOOD PRESSURE: 72 MMHG | SYSTOLIC BLOOD PRESSURE: 114 MMHG | OXYGEN SATURATION: 99 % | RESPIRATION RATE: 17 BRPM | TEMPERATURE: 99 F

## 2023-03-23 DIAGNOSIS — R50.9 FEVER, UNSPECIFIED: ICD-10-CM

## 2023-03-23 DIAGNOSIS — Z20.822 CONTACT WITH AND (SUSPECTED) EXPOSURE TO COVID-19: ICD-10-CM

## 2023-03-23 DIAGNOSIS — B34.9 VIRAL INFECTION, UNSPECIFIED: ICD-10-CM

## 2023-03-23 DIAGNOSIS — J02.9 ACUTE PHARYNGITIS, UNSPECIFIED: ICD-10-CM

## 2023-03-23 DIAGNOSIS — R52 PAIN, UNSPECIFIED: ICD-10-CM

## 2023-03-23 LAB
FLUAV AG NPH QL: SIGNIFICANT CHANGE UP
FLUBV AG NPH QL: SIGNIFICANT CHANGE UP
RSV RNA NPH QL NAA+NON-PROBE: SIGNIFICANT CHANGE UP
S PYO AG SPEC QL IA: POSITIVE
SARS-COV-2 RNA SPEC QL NAA+PROBE: SIGNIFICANT CHANGE UP

## 2023-03-23 PROCEDURE — 96372 THER/PROPH/DIAG INJ SC/IM: CPT

## 2023-03-23 PROCEDURE — 99283 EMERGENCY DEPT VISIT LOW MDM: CPT | Mod: 25

## 2023-03-23 PROCEDURE — 99284 EMERGENCY DEPT VISIT MOD MDM: CPT

## 2023-03-23 PROCEDURE — 0241U: CPT

## 2023-03-23 PROCEDURE — 87880 STREP A ASSAY W/OPTIC: CPT

## 2023-03-23 RX ORDER — DEXAMETHASONE 0.5 MG/5ML
10 ELIXIR ORAL ONCE
Refills: 0 | Status: COMPLETED | OUTPATIENT
Start: 2023-03-23 | End: 2023-03-23

## 2023-03-23 RX ORDER — KETOROLAC TROMETHAMINE 30 MG/ML
30 SYRINGE (ML) INJECTION ONCE
Refills: 0 | Status: DISCONTINUED | OUTPATIENT
Start: 2023-03-23 | End: 2023-03-23

## 2023-03-23 RX ORDER — AMOXICILLIN 250 MG/5ML
875 SUSPENSION, RECONSTITUTED, ORAL (ML) ORAL ONCE
Refills: 0 | Status: COMPLETED | OUTPATIENT
Start: 2023-03-23 | End: 2023-03-23

## 2023-03-23 RX ORDER — AMOXICILLIN 250 MG/5ML
1 SUSPENSION, RECONSTITUTED, ORAL (ML) ORAL
Qty: 19 | Refills: 0
Start: 2023-03-23 | End: 2023-04-01

## 2023-03-23 RX ADMIN — Medication 10 MILLIGRAM(S): at 04:17

## 2023-03-23 RX ADMIN — Medication 875 MILLIGRAM(S): at 05:21

## 2023-03-23 RX ADMIN — Medication 30 MILLIGRAM(S): at 04:17

## 2023-03-23 NOTE — ED ADULT TRIAGE NOTE - GLASGOW COMA SCALE: BEST VERBAL RESPONSE, MLM
Problem: NORMAL   Goal: Experiences normal transition  Description  INTERVENTIONS:  - Assess and monitor vital signs and lab values.   - Encourage skin-to-skin with caregiver for thermoregulation  - Assess signs, symptoms and risk factors for hypog (V5) oriented

## 2023-03-23 NOTE — ED PROVIDER NOTE - OBJECTIVE STATEMENT
22-year-old male with sore throat fever body aches for the past 2 days.  Patient states his throat was hurting a lot earlier he took some Tylenol for pain which did improve things.  Denies any neck pain or stiffness.  He is able to swallow by painful.   no difficulty opening his mouth..  No sick contacts.

## 2023-03-23 NOTE — ED PROVIDER NOTE - PATIENT PORTAL LINK FT
You can access the FollowMyHealth Patient Portal offered by [facility] by registering at the following website: [email or web address]. By joining [employer]’s FollowMyHealth portal, you will also be able to view your health information using other applications (apps) compatible with our system.

## 2023-03-23 NOTE — ED PROVIDER NOTE - CLINICAL SUMMARY MEDICAL DECISION MAKING FREE TEXT BOX
22-year-old male with flulike symptoms and sore throat.  Well-appearing.  Tolerating oral intake.  Will swab for flu/COVID/RSV as well as rapid strep.  Will give Toradol and Decadron for pain and swelling.

## 2023-03-23 NOTE — ED ADULT TRIAGE NOTE - CHIEF COMPLAINT QUOTE
pt ambulatory to ED c/o fever. pt states that he developed a fever, nausea, headache, sore throat, chills 6p yesterday. took tylenol at 11p for 105 fever. VSS in triage, denies current dizziness/headache/chest pain/SOB/V/D/cough.

## 2023-05-25 NOTE — ED ADULT NURSE NOTE - PAIN RATING/NUMBER SCALE (0-10): REST
4 Ketoconazole Pregnancy And Lactation Text: This medication is Pregnancy Category C and it isn't know if it is safe during pregnancy. It is also excreted in breast milk and breast feeding isn't recommended.

## 2023-12-19 RX ORDER — HYOSCYAMINE SULFATE 0.12 MG/1
0.12 TABLET, ORALLY DISINTEGRATING ORAL
Qty: 90 | Refills: 3 | Status: ACTIVE | COMMUNITY
Start: 2023-02-23 | End: 1900-01-01

## 2024-03-05 ENCOUNTER — APPOINTMENT (OUTPATIENT)
Dept: FAMILY MEDICINE | Facility: CLINIC | Age: 24
End: 2024-03-05
Payer: SELF-PAY

## 2024-03-05 VITALS
HEART RATE: 57 BPM | DIASTOLIC BLOOD PRESSURE: 62 MMHG | BODY MASS INDEX: 24.05 KG/M2 | WEIGHT: 168 LBS | OXYGEN SATURATION: 98 % | HEIGHT: 70 IN | TEMPERATURE: 98.3 F | SYSTOLIC BLOOD PRESSURE: 118 MMHG

## 2024-03-05 DIAGNOSIS — Z00.00 ENCOUNTER FOR GENERAL ADULT MEDICAL EXAMINATION W/OUT ABNORMAL FINDINGS: ICD-10-CM

## 2024-03-05 DIAGNOSIS — M54.50 LOW BACK PAIN, UNSPECIFIED: ICD-10-CM

## 2024-03-05 PROCEDURE — 36415 COLL VENOUS BLD VENIPUNCTURE: CPT

## 2024-03-05 PROCEDURE — 99395 PREV VISIT EST AGE 18-39: CPT

## 2024-03-05 NOTE — PHYSICAL EXAM
[No Acute Distress] : no acute distress [Well Nourished] : well nourished [Well-Appearing] : well-appearing [Well Developed] : well developed [Normal Sclera/Conjunctiva] : normal sclera/conjunctiva [PERRL] : pupils equal round and reactive to light [EOMI] : extraocular movements intact [Normal Oropharynx] : the oropharynx was normal [Normal Outer Ear/Nose] : the outer ears and nose were normal in appearance [No JVD] : no jugular venous distention [No Lymphadenopathy] : no lymphadenopathy [Supple] : supple [No Respiratory Distress] : no respiratory distress  [Thyroid Normal, No Nodules] : the thyroid was normal and there were no nodules present [No Accessory Muscle Use] : no accessory muscle use [Clear to Auscultation] : lungs were clear to auscultation bilaterally [Normal Rate] : normal rate  [Regular Rhythm] : with a regular rhythm [Normal S1, S2] : normal S1 and S2 [No Murmur] : no murmur heard [No Carotid Bruits] : no carotid bruits [No Abdominal Bruit] : a ~M bruit was not heard ~T in the abdomen [No Varicosities] : no varicosities [Pedal Pulses Present] : the pedal pulses are present [No Edema] : there was no peripheral edema [No Palpable Aorta] : no palpable aorta [No Extremity Clubbing/Cyanosis] : no extremity clubbing/cyanosis [Soft] : abdomen soft [Non Tender] : non-tender [Non-distended] : non-distended [No Masses] : no abdominal mass palpated [No HSM] : no HSM [Normal Bowel Sounds] : normal bowel sounds [Normal Posterior Cervical Nodes] : no posterior cervical lymphadenopathy [Normal Anterior Cervical Nodes] : no anterior cervical lymphadenopathy [No CVA Tenderness] : no CVA  tenderness [No Spinal Tenderness] : no spinal tenderness [No Joint Swelling] : no joint swelling [Grossly Normal Strength/Tone] : grossly normal strength/tone [No Rash] : no rash [Coordination Grossly Intact] : coordination grossly intact [No Focal Deficits] : no focal deficits [Normal Gait] : normal gait [Deep Tendon Reflexes (DTR)] : deep tendon reflexes were 2+ and symmetric [Normal Affect] : the affect was normal [Normal Insight/Judgement] : insight and judgment were intact

## 2024-03-07 LAB
ALBUMIN SERPL ELPH-MCNC: 5 G/DL
ALP BLD-CCNC: 85 U/L
ALT SERPL-CCNC: 14 U/L
ANION GAP SERPL CALC-SCNC: 15 MMOL/L
AST SERPL-CCNC: 11 U/L
BILIRUB SERPL-MCNC: 0.4 MG/DL
BUN SERPL-MCNC: 10 MG/DL
CALCIUM SERPL-MCNC: 9.4 MG/DL
CHLORIDE SERPL-SCNC: 100 MMOL/L
CHOLEST SERPL-MCNC: 142 MG/DL
CO2 SERPL-SCNC: 24 MMOL/L
CREAT SERPL-MCNC: 0.92 MG/DL
EGFR: 121 ML/MIN/1.73M2
ESTIMATED AVERAGE GLUCOSE: 108 MG/DL
GLUCOSE SERPL-MCNC: 82 MG/DL
HBA1C MFR BLD HPLC: 5.4 %
HCT VFR BLD CALC: 40.6 %
HDLC SERPL-MCNC: 39 MG/DL
HGB BLD-MCNC: 13.7 G/DL
LDLC SERPL CALC-MCNC: 85 MG/DL
MCHC RBC-ENTMCNC: 29.1 PG
MCHC RBC-ENTMCNC: 33.7 GM/DL
MCV RBC AUTO: 86.4 FL
NONHDLC SERPL-MCNC: 103 MG/DL
PLATELET # BLD AUTO: 277 K/UL
POTASSIUM SERPL-SCNC: 4.1 MMOL/L
PROT SERPL-MCNC: 8 G/DL
RBC # BLD: 4.7 M/UL
RBC # FLD: 12.4 %
SODIUM SERPL-SCNC: 138 MMOL/L
T3FREE SERPL-MCNC: 3.46 PG/ML
T4 FREE SERPL-MCNC: 1.4 NG/DL
TRIGL SERPL-MCNC: 97 MG/DL
TSH SERPL-ACNC: 0.93 UIU/ML
WBC # FLD AUTO: 8.69 K/UL

## 2024-03-12 NOTE — HEALTH RISK ASSESSMENT
[1 or 2 (0 pts)] : 1 or 2 (0 points) [Never (0 pts)] : Never (0 points) [No] : In the past 12 months have you used drugs other than those required for medical reasons? No [No falls in past year] : Patient reported no falls in the past year [0] : 2) Feeling down, depressed, or hopeless: Not at all (0) [PHQ-2 Negative - No further assessment needed] : PHQ-2 Negative - No further assessment needed [HIV test declined] : HIV test declined [Hepatitis C test declined] : Hepatitis C test declined [With Family] : lives with family [# of Members in Household ___] :  household currently consist of [unfilled] member(s) [Employed] : employed [Student] : student [Single] : single [College] : College [Fully functional (bathing, dressing, toileting, transferring, walking, feeding)] : Fully functional (bathing, dressing, toileting, transferring, walking, feeding) [Feels Safe at Home] : Feels safe at home [Fully functional (using the telephone, shopping, preparing meals, housekeeping, doing laundry, using] : Fully functional and needs no help or supervision to perform IADLs (using the telephone, shopping, preparing meals, housekeeping, doing laundry, using transportation, managing medications and managing finances) [Reports normal functional visual acuity (ie: able to read med bottle)] : Reports normal functional visual acuity [Smoke Detector] : smoke detector [Carbon Monoxide Detector] : carbon monoxide detector [Seat Belt] :  uses seat belt [Sunscreen] : uses sunscreen [Never] : Never [[id number]] : 0 [Audit-CScore] : 0 [Change in mental status noted] : No change in mental status noted [Language] : denies difficulty with language [Behavior] : denies difficulty with behavior [Learning/Retaining New Information] : denies difficulty learning/retaining new information [Reasoning] : denies difficulty with reasoning [Handling Complex Tasks] : denies difficulty handling complex tasks [Spatial Ability and Orientation] : denies difficulty with spatial ability and orientation [Reports changes in hearing] : Reports no changes in hearing [Reports changes in vision] : Reports no changes in vision [Reports changes in dental health] : Reports no changes in dental health [Guns at Home] : no guns at home [Travel to Developing Areas] : does not  travel to developing areas [TB Exposure] : is not being exposed to tuberculosis

## 2024-09-03 NOTE — ED ADULT TRIAGE NOTE - STATUS:
Price (Do Not Change): 0.00 Instructions: This plan will send the code FBSD to the PM system.  DO NOT or CHANGE the price. Detail Level: Simple Applied

## 2024-11-07 NOTE — ED ADULT NURSE NOTE - CHPI ED NUR SYMPTOMS POS
Detail Level: Detailed Quality 402: Tobacco Use And Help With Quitting Among Adolescents: Patient screened for tobacco and never smoked FEVER/HEADACHE/PAIN

## 2025-03-13 NOTE — ED ADULT TRIAGE NOTE - NS ED TRIAGE AVPU SCALE
The patient No Show to appointment DOS 3/04/2025. Initial No Show letter sent via [employer] 3/13/2025.   Alert-The patient is alert, awake and responds to voice. The patient is oriented to time, place, and person. The triage nurse is able to obtain subjective information.